# Patient Record
Sex: FEMALE | Race: BLACK OR AFRICAN AMERICAN
[De-identification: names, ages, dates, MRNs, and addresses within clinical notes are randomized per-mention and may not be internally consistent; named-entity substitution may affect disease eponyms.]

---

## 2017-12-08 ENCOUNTER — HOSPITAL ENCOUNTER (EMERGENCY)
Dept: HOSPITAL 61 - ER | Age: 27
Discharge: HOME | End: 2017-12-08
Payer: COMMERCIAL

## 2017-12-08 VITALS — HEIGHT: 66 IN | BODY MASS INDEX: 21.69 KG/M2 | WEIGHT: 135 LBS

## 2017-12-08 VITALS — SYSTOLIC BLOOD PRESSURE: 116 MMHG | DIASTOLIC BLOOD PRESSURE: 68 MMHG

## 2017-12-08 DIAGNOSIS — M32.9: ICD-10-CM

## 2017-12-08 DIAGNOSIS — L12.0: Primary | ICD-10-CM

## 2017-12-08 DIAGNOSIS — Z88.8: ICD-10-CM

## 2017-12-08 DIAGNOSIS — Z88.2: ICD-10-CM

## 2017-12-08 DIAGNOSIS — Z88.5: ICD-10-CM

## 2017-12-08 PROCEDURE — 99283 EMERGENCY DEPT VISIT LOW MDM: CPT

## 2017-12-08 NOTE — PHYS DOC
Past Medical History


Past Medical History:  Other


Additional Past Medical Histor:  LUPUS


Past Surgical History:  No Surgical History


Alcohol Use:  Rarely


Drug Use:  None





Adult General


Chief Complaint


Chief Complaint:  SKIN PROBLEM





HPI


HPI





Patient is a 27  year old female presents the ED complaining of blisters to 

body 3 days. Patient has a history of lupus. States she started to develop 

small blisters on her arms and now it's on her upper body, abdomen, back and 

neck. Describes the blisters as painful. Rates the pain as 9 out of 10. States 

she hasn't taken any new medications, new soaps, new lotions or detergents. 

States she takes medicine that she has always been taking for her lupus with no 

recent changes. Patient states she was unable to get in to see her 

rheumatologist today. Denies fever, nausea/vomiting, sick contacts with similar 

symptoms, abdominal pain, chest pain, shortness of breath, vision changes, 

dizziness or weakness.





Review of Systems


Review of Systems





Constitutional: Denies fever or chills []


Eyes: Denies change in visual acuity, redness, or eye pain []


HENT: Denies nasal congestion or sore throat []


Respiratory: Denies cough or shortness of breath []


Cardiovascular: No additional information not addressed in HPI []


GI: Denies abdominal pain, nausea, vomiting, bloody stools or diarrhea []


: Denies dysuria or hematuria []


Musculoskeletal: Denies back pain or joint pain []


Integument: Denies rash or skin lesions []


Neurologic: Denies headache, focal weakness or sensory changes []


Endocrine: Denies polyuria or polydipsia []





All other systems were reviewed and found to be within normal limits, except as 

documented in this note.





Allergies


Allergies





Allergies








Coded Allergies Type Severity Reaction Last Updated Verified


 


  hydrocodone Allergy Intermediate  12/8/17 No


 


  sulfamethoxazole Allergy Intermediate  12/8/17 No


 


  trimethoprim Allergy Intermediate  12/8/17 No











Physical Exam


Physical Exam





Constitutional: Well developed, well nourished, no acute distress, non-toxic 

appearance. []


HENT: Normocephalic, atraumatic, bilateral external ears normal, oropharynx 

moist, no oral exudates, nose normal. []


Eyes: PERRLA, EOMI, conjunctiva normal, no discharge. [] 


Cardiovascular:Heart rate regular rhythm, no murmur []


Lungs & Thorax:  Bilateral breath sounds clear to auscultation []


Skin: Warm, dry, MULTILPLE SMALL BLISTERS TO ARMS, BACK, TRUNK AND NECK 

CONSISTENT WITH BULLOUS PEMPHIGOID. [] 


Back: No tenderness, no CVA tenderness. [] 


Neurologic: Alert and oriented X 3, normal motor function, normal sensory 

function, no focal deficits noted. []


Psychologic: Affect normal, judgement normal, mood normal. []





Current Patient Data


Vital Signs





 Vital Signs








  Date Time  Temp Pulse Resp B/P (MAP) Pulse Ox O2 Delivery O2 Flow Rate FiO2


 


12/8/17 13:42 98.6 85 18  100 Room Air  





 98.6       











EKG


EKG


[]





Radiology/Procedures


Radiology/Procedures


[]





Course & Med Decision Making


Course & Med Decision Making


Pertinent Labs and Imaging studies reviewed. (See chart for details)





[]Patient tried to get in to see her rheumatologist today but was unable to. 

Discussed with patient the need to see her rheumatologist for further 

evaluation. Patient's tetanus up-to-date. Patient's pain improved in the ED. 

Provided in-depth education and symptomatic treatment measures for patient. 

Will discharge with doxycycline, prednisone and Percocet for analgesics. 

Patient states she should be able to get into see her doctor early next week. 

Discussed reasons to return to the ED. Patient understands and agrees with plan.





Dragon Disclaimer


Dragon Disclaimer


This electronic medical record was generated, in whole or in part, using a 

voice recognition dictation system.





Departure


Departure


Impression:  


 Primary Impression:  


 Bullous pemphigoid


Disposition:  01 HOME, SELF-CARE


Condition:  STABLE


Referrals:  


NON,STAFF (PCP)








LYN MONAE MD


Patient Instructions:  Bullous Pemphigoid


Scripts


Oxycodone/Apap 5-325 (PERCOCET 5-325 MG TABLET) 1 Each Tablet


1 TAB PO TID, #12 TAB


   Prov: EMILIE BOONE         12/8/17 


Prednisone (PREDNISONE) 20 Mg Tablet


2 TAB PO DAILY, #12 TAB


   Prov: EMILIE BOONE         12/8/17 


Doxycycline Hyclate (DOXYCYCLINE HYCLATE) 100 Mg Tablet.


1 TAB PO BID, #20 TAB


   Prov: EMILIE BOONE         12/8/17











EMILIE BOONE Dec 8, 2017 14:23

## 2017-12-09 ENCOUNTER — HOSPITAL ENCOUNTER (EMERGENCY)
Dept: HOSPITAL 61 - ER | Age: 27
Discharge: HOME | End: 2017-12-09
Payer: COMMERCIAL

## 2017-12-09 VITALS — BODY MASS INDEX: 21.19 KG/M2 | WEIGHT: 135 LBS | HEIGHT: 67 IN

## 2017-12-09 VITALS — DIASTOLIC BLOOD PRESSURE: 85 MMHG | SYSTOLIC BLOOD PRESSURE: 149 MMHG

## 2017-12-09 DIAGNOSIS — Z88.5: ICD-10-CM

## 2017-12-09 DIAGNOSIS — Z76.5: ICD-10-CM

## 2017-12-09 DIAGNOSIS — Z88.2: ICD-10-CM

## 2017-12-09 DIAGNOSIS — L12.0: Primary | ICD-10-CM

## 2017-12-09 DIAGNOSIS — M32.9: ICD-10-CM

## 2017-12-09 DIAGNOSIS — Z88.1: ICD-10-CM

## 2017-12-09 DIAGNOSIS — F11.10: ICD-10-CM

## 2017-12-09 PROCEDURE — 99282 EMERGENCY DEPT VISIT SF MDM: CPT

## 2017-12-09 NOTE — PHYS DOC
Past Medical History


Past Medical History:  Other


Additional Past Medical Histor:  LUPUS


Past Surgical History:  No Surgical History


Alcohol Use:  Rarely


Drug Use:  None





Adult General


Chief Complaint


Chief Complaint:  PAIN CONTROL





HPI


HPI





Patient is a 27  year old female who presents complaining her prescription for 

oxycodone was cancelled by Dr. Park yesterday and she would like another 

prescription. She was seen yesterday in the ED for bullous pemphigoid and was 

discharged with doxycycline, prednisone and oxycodone. She states she went to 

the pharmacy and they counseled her oxycodone prescription. She has hx of Lupus.





Review of Systems


Review of Systems





Constitutional: Denies fever or chills []


Eyes: Denies change in visual acuity, redness, or eye pain []


HENT: Denies nasal congestion or sore throat []


Respiratory: Denies cough or shortness of breath []


Cardiovascular: No additional information not addressed in HPI []


GI: Denies abdominal pain, nausea, vomiting, bloody stools or diarrhea []


: Denies dysuria or hematuria []


Musculoskeletal: Denies back pain or joint pain []


Integument: rash


Neurologic: Denies headache, focal weakness or sensory changes []








All other systems were reviewed and found to be within normal limits, except as 

documented in this note.





Current Medications


Current Medications





Current Medications








 Medications


  (Trade)  Dose


 Ordered  Sig/Tomas  Start Time


 Stop Time Status Last Admin


Dose Admin


 


 Acetaminophen


  (Tylenol)  500 mg  1X  ONCE  17 18:30


 17 18:35 DC 17 18:31


500 MG











Allergies


Allergies





Allergies








Coded Allergies Type Severity Reaction Last Updated Verified


 


  hydrocodone Allergy Intermediate  17 No


 


  sulfamethoxazole Allergy Intermediate  17 No


 


  trimethoprim Allergy Intermediate  17 No











Physical Exam


Physical Exam





Constitutional: Well developed, well nourished, no acute distress, non-toxic 

appearance. []


HENT: Normocephalic, atraumatic, bilateral external ears normal, oropharynx 

moist, no oral exudates, nose normal. []


Eyes: PERRLA, EOMI, conjunctiva normal, no discharge. [] 


Neck: Normal range of motion, no tenderness, supple, no stridor. [] 


Cardiovascular:Heart rate regular rhythm, no murmur []


Lungs & Thorax:  Bilateral breath sounds clear to auscultation []


Abdomen: Bowel sounds normal, soft, no tenderness, no masses, no pulsatile 

masses. [] 


Skin: Warm, dry, blisters noted to bilateral upper extermities consistent with 

bullous pemphigoid. Patient not willing to be examined further she states she 

wants pain medicines.


Back: No tenderness, no CVA tenderness. [] 


Extremities: No tenderness, no cyanosis, no clubbing, ROM intact, no edema. [] 


Neurologic: Alert and oriented X 3, normal motor function, normal sensory 

function, no focal deficits noted. []


Psychologic: Affect normal, judgement normal, mood normal. []





Current Patient Data


Vital Signs





 Vital Signs








  Date Time  Temp Pulse Resp B/P (MAP) Pulse Ox O2 Delivery O2 Flow Rate FiO2


 


17 18:15 99.2 106 16  97 Room Air  





 99.2       











EKG


EKG


[]





Radiology/Procedures


Radiology/Procedures


[]





Course & Med Decision Making


Course & Med Decision Making


Pertinent Labs and Imaging studies reviewed. (See chart for details)





This is a 27 year old female patient presenting to the ED complaining that her 

prescription for oxycodone she got yesterday for bullous pemphigoid lesions was 

cancelled. Informed patient I will not refill this prescription. I will give 

her Tylenol in the Ed and she should fill rx for doxycycline and prednisone but 

she got yesterday. Patient with very upset. She states that she is going in the 

car and never returned.








INFORMED PATIENT I JUST SAW HER IN THE ED ON 2017 (UNDER THE NAME  BAKARI MARTINI  12/3/1989). SHE STATES THAT IS HER TWIN SISTER. I ASKED HER, HER TWIN 

SISTER HAS THE SAME EXACT TWO GIRLS SHE HAD WHEN I SAW HER 2017. SHE 

STATES SHE HAS two BOYS, SHE IS BABY SITING HER SISTERS CHILDREN. I ASKED HER 

WHERE HER CHILDREN WERE. SHE WILL NOT GIVE ME ANSWERS. PLEASE TAKE NOTE HER  

IS DIFFERENT FROM HER SO CALLED TWIN SISTER'S DATE OF BIRTH. SHE ELOPED AS SOON 

AS SHE REALIZED WE HAD FIGURED HER ALIAS NAMES.





Dragon Disclaimer


Dragon Disclaimer


This electronic medical record was generated, in whole or in part, using a 

voice recognition dictation system.





Departure


Departure


Impression:  


 Primary Impression:  


 Bullous pemphigoid


 Additional Impressions:  


 Narcotic abuse


 Drug-seeking behavior


Disposition:   HOME, SELF-CARE


Condition:  STABLE


Referrals:  


NO PCP (PCP)


follow up with your doctor next week


Patient Instructions:  Bullous Pemphigoid





Additional Instructions:  


Our ER will NOT give you another narcotics prescription.





You need to follow up with your own doctor as soon as you can.





Please fill prescriptions for prednisone and doxycycline that you already have.





Take over the counter pain medicines as needed for pain.





Problem Qualifiers











RUTHY ROSE Dec 9, 2017 18:30

## 2019-04-19 ENCOUNTER — HOSPITAL ENCOUNTER (EMERGENCY)
Dept: HOSPITAL 61 - ER | Age: 29
Discharge: HOME | End: 2019-04-19
Payer: COMMERCIAL

## 2019-04-19 VITALS — WEIGHT: 170 LBS | HEIGHT: 69 IN | BODY MASS INDEX: 25.18 KG/M2

## 2019-04-19 VITALS — SYSTOLIC BLOOD PRESSURE: 103 MMHG | DIASTOLIC BLOOD PRESSURE: 53 MMHG

## 2019-04-19 DIAGNOSIS — R42: ICD-10-CM

## 2019-04-19 DIAGNOSIS — R53.83: ICD-10-CM

## 2019-04-19 DIAGNOSIS — Z88.1: ICD-10-CM

## 2019-04-19 DIAGNOSIS — Z88.5: ICD-10-CM

## 2019-04-19 DIAGNOSIS — R11.0: ICD-10-CM

## 2019-04-19 DIAGNOSIS — N92.1: Primary | ICD-10-CM

## 2019-04-19 DIAGNOSIS — R53.1: ICD-10-CM

## 2019-04-19 DIAGNOSIS — Z88.2: ICD-10-CM

## 2019-04-19 LAB
ANION GAP SERPL CALC-SCNC: 13 MMOL/L (ref 6–14)
ANISOCYTOSIS BLD QL SMEAR: (no result)
BASO STIPL BLD QL SMEAR: PRESENT
BASOPHILS # BLD AUTO: 0.1 X10^3/UL (ref 0–0.2)
BASOPHILS NFR BLD: 1 % (ref 0–3)
BUN SERPL-MCNC: 15 MG/DL (ref 7–20)
CALCIUM SERPL-MCNC: 9 MG/DL (ref 8.5–10.1)
CHLORIDE SERPL-SCNC: 103 MMOL/L (ref 98–107)
CO2 SERPL-SCNC: 23 MMOL/L (ref 21–32)
CREAT SERPL-MCNC: 0.8 MG/DL (ref 0.6–1)
EOSINOPHIL NFR BLD: 0.2 X10^3/UL (ref 0–0.7)
EOSINOPHIL NFR BLD: 2 % (ref 0–3)
ERYTHROCYTE [DISTWIDTH] IN BLOOD BY AUTOMATED COUNT: 20.2 % (ref 11.5–14.5)
GFR SERPLBLD BASED ON 1.73 SQ M-ARVRAT: 104.9 ML/MIN
GLUCOSE SERPL-MCNC: 108 MG/DL (ref 70–99)
HCT VFR BLD CALC: 28.7 % (ref 36–47)
HGB BLD-MCNC: 8.7 G/DL (ref 12–15.5)
HYPOCHROMIA BLD QL SMEAR: (no result)
LYMPHOCYTES # BLD: 2 X10^3/UL (ref 1–4.8)
LYMPHOCYTES NFR BLD AUTO: 20 % (ref 24–48)
MAGNESIUM SERPL-MCNC: 2 MG/DL (ref 1.8–2.4)
MCH RBC QN AUTO: 20 PG (ref 25–35)
MCHC RBC AUTO-ENTMCNC: 31 G/DL (ref 31–37)
MCV RBC AUTO: 66 FL (ref 79–100)
MICROCYTES BLD QL SMEAR: (no result)
MONO #: 0.5 X10^3/UL (ref 0–1.1)
MONOCYTES NFR BLD: 5 % (ref 0–9)
NEUT #: 7.2 X10^3UL (ref 1.8–7.7)
NEUTROPHILS NFR BLD AUTO: 72 % (ref 31–73)
OVALOCYTES BLD QL SMEAR: (no result)
PLATELET # BLD AUTO: 423 X10^3/UL (ref 140–400)
PLATELET # BLD EST: ADEQUATE 10*3/UL
POIKILOCYTOSIS BLD QL SMEAR: (no result)
POLYCHROMASIA BLD QL SMEAR: SLIGHT
POTASSIUM SERPL-SCNC: 3.7 MMOL/L (ref 3.5–5.1)
RBC # BLD AUTO: 4.35 X10^6/UL (ref 3.5–5.4)
SCHISTOCYTES BLD QL SMEAR: (no result)
SODIUM SERPL-SCNC: 139 MMOL/L (ref 136–145)
WBC # BLD AUTO: 10 X10^3/UL (ref 4–11)

## 2019-04-19 PROCEDURE — 85025 COMPLETE CBC W/AUTO DIFF WBC: CPT

## 2019-04-19 PROCEDURE — 81025 URINE PREGNANCY TEST: CPT

## 2019-04-19 PROCEDURE — 76856 US EXAM PELVIC COMPLETE: CPT

## 2019-04-19 PROCEDURE — 87591 N.GONORRHOEAE DNA AMP PROB: CPT

## 2019-04-19 PROCEDURE — 80048 BASIC METABOLIC PNL TOTAL CA: CPT

## 2019-04-19 PROCEDURE — 76830 TRANSVAGINAL US NON-OB: CPT

## 2019-04-19 PROCEDURE — 83735 ASSAY OF MAGNESIUM: CPT

## 2019-04-19 PROCEDURE — 99285 EMERGENCY DEPT VISIT HI MDM: CPT

## 2019-04-19 PROCEDURE — 87491 CHLMYD TRACH DNA AMP PROBE: CPT

## 2019-04-19 PROCEDURE — 96360 HYDRATION IV INFUSION INIT: CPT

## 2019-04-19 PROCEDURE — 36415 COLL VENOUS BLD VENIPUNCTURE: CPT

## 2019-04-19 NOTE — PHYS DOC
Past Medical History


Past Medical History:  Other


Additional Past Medical Histor:  LUPUS


Past Surgical History:  No Surgical History


Alcohol Use:  Rarely


Drug Use:  None





Adult General


Chief Complaint


Chief Complaint:  VAGINAL BLEEDING





HPI


HPI





Patient is a  28 year old female who presents with vaginal bleeding. 

Patient states that she has been continuously bleeding for the past three 

months. Patient states that she has been going through about fifteen pads a 

day. Patient states that the blood is bright red. Patient states that the 

bleeding became more significant and associated with large clots over the past 

few days. Patient reports feeling nauseous, weak, fatigued, and lightheaded 

over the past three months. Patient reports having a decreased appetite and 

losing fifty pounds unintentionally in the past three months. Patient states 

that she last gave birth on 20 and had a tubal ligation afterwards. 

Patient denies having any abdominal pain. Patient states that she has noticed a 

vaginal odor, however, she denies any abnormal discharge.





Review of Systems


Review of Systems





Constitutional: Denies fever or chills 


Eyes: Denies change in visual acuity, or eye pain 


HENT: Denies nasal congestion or sore throat 


Respiratory: Denies cough or shortness of breath 


Cardiovascular: Denies chest pain or palpitations


GI: Denies abdominal pain, vomiting, or diarrhea


: Denies dysuria or hematuria 


Musculoskeletal: Denies back pain or joint pain 


Integument: Denies rash or skin lesions 


Neurologic: Denies headache, focal weakness or sensory changes 








Complete systems were reviewed and found to be within normal limits, except as 

documented in this note.





Current Medications


Current Medications





Current Medications








 Medications


  (Trade)  Dose


 Ordered  Sig/Tomas  Start Time


 Stop Time Status Last Admin


Dose Admin


 


 Sodium Chloride  1,000 ml @ 


 1,000 mls/hr  1X  ONCE  19 03:45


 19 04:44  19 04:19


1,000 MLS/HR











Allergies


Allergies





Allergies








Coded Allergies Type Severity Reaction Last Updated Verified


 


  hydrocodone Allergy Intermediate  17 No


 


  sulfamethoxazole Allergy Intermediate  17 No


 


  trimethoprim Allergy Intermediate  17 No











Physical Exam


Physical Exam





Constitutional: Well developed, well nourished, no acute distress, non-toxic 

appearance. 


HENT: Normocephalic, atraumatic, oropharynx moist, no oral exudates, nose 

normal. 


Eyes: PERRL, conjunctiva normal, no discharge.  


Neck: Normal range of motion, supple, no stridor. 


Cardiovascular:Heart rate regular rhythm, no murmur 


Lungs & Thorax:  Bilateral breath sounds clear to auscultation 


Abdomen: Bowel sounds normal, soft, no tenderness on palpation.


Skin: Warm, dry, no rash. 


Back: No tenderness, no CVA tenderness. 


Extremities: No tenderness, ROM intact, no edema. 


Pelvic: Scant blood noted in the vaginal vault. Female chaperone, Elvia MS3,  

present during the entire examination.  


Neurologic: Alert and oriented X3, normal motor function, normal sensory 

function, no focal deficits noted.


Psychologic: Affect normal. Normal speech.





Current Patient Data


Vital Signs





 Vital Signs








  Date Time  Temp Pulse Resp B/P (MAP) Pulse Ox O2 Delivery O2 Flow Rate FiO2


 


19 03:08 98.9  16 126/70 (88) 100 Room Air  





 98.9       








Lab Values





 Laboratory Tests








Test


 19


03:18 19


03:30


 


POC Urine HCG, Qualitative


 Hcg negative


(Negative) 





 


White Blood Count


 


 10.0 x10^3/uL


(4.0-11.0)


 


Red Blood Count


 


 4.35 x10^6/uL


(3.50-5.40)


 


Hemoglobin


 


 8.7 g/dL


(12.0-15.5)  L


 


Hematocrit


 


 28.7 %


(36.0-47.0)  L


 


Mean Corpuscular Volume


 


 66 fL ()


L


 


Mean Corpuscular Hemoglobin


 


 20 pg (25-35)


L


 


Mean Corpuscular Hemoglobin


Concent 


 31 g/dL


(31-37)


 


Red Cell Distribution Width


 


 20.2 %


(11.5-14.5)  H


 


Platelet Count


 


 423 x10^3/uL


(140-400)  H


 


Neutrophils (%) (Auto)  72 % (31-73)  


 


Lymphocytes (%) (Auto)  20 % (24-48)  L


 


Monocytes (%) (Auto)  5 % (0-9)  


 


Eosinophils (%) (Auto)  2 % (0-3)  


 


Basophils (%) (Auto)  1 % (0-3)  


 


Neutrophils # (Auto)


 


 7.2 x10^3uL


(1.8-7.7)


 


Lymphocytes # (Auto)


 


 2.0 x10^3/uL


(1.0-4.8)


 


Monocytes # (Auto)


 


 0.5 x10^3/uL


(0.0-1.1)


 


Eosinophils # (Auto)


 


 0.2 x10^3/uL


(0.0-0.7)


 


Basophils # (Auto)


 


 0.1 x10^3/uL


(0.0-0.2)


 


Platelet Estimate


 


 Adequate


(ADEQUATE)


 


Polychromasia  Slight  


 


Hypochromasia  Marked  


 


Poikilocytosis  Mod  


 


Basophilic Stippling  Present  


 


Anisocytosis  Mod  


 


Microcytosis  Marked  


 


Ovalocytes  Few  


 


Schistocytes  Occ  


 


Sodium Level


 


 139 mmol/L


(136-145)


 


Potassium Level


 


 3.7 mmol/L


(3.5-5.1)


 


Chloride Level


 


 103 mmol/L


()


 


Carbon Dioxide Level


 


 23 mmol/L


(21-32)


 


Anion Gap  13 (6-14)  


 


Blood Urea Nitrogen


 


 15 mg/dL


(7-20)


 


Creatinine


 


 0.8 mg/dL


(0.6-1.0)


 


Estimated GFR


(Cockcroft-Gault) 


 104.9  





 


Glucose Level


 


 108 mg/dL


(70-99)  H


 


Calcium Level


 


 9.0 mg/dL


(8.5-10.1)


 


Magnesium Level


 


 2.0 mg/dL


(1.8-2.4)





 Laboratory Tests


19 03:30








 Laboratory Tests


19 03:30











Microbiology


19 Wet Prep - Final, Complete


          





Microbiology


19 Wet Prep - Final, Complete





EKG


EKG


[]





Radiology/Procedures


Radiology/Procedures


PROCEDURE: PELVIS W/TV





Complete pelvic ultrasound


 


HISTORY: Menorrhagia, 10 months postpartum


 


TECHNIQUE: Transabdominal transvaginal transducers with grayscale and 


duplex Doppler sonography.


 


FINDINGS: Transabdominal sonography demonstrates anteverted uterus. Right 


ovary measures 4.0 x 1.7 x 2.3 cm with a 1.4 cm dominant follicle. Left 


ovary not visualized.


 


Transvaginal sonography demonstrates anteverted uterus measuring 8.9 x 6.7


x 4.3 cm. Fundal endometrium thickness 0.5 cm. Mild hypoechoic fluid at 


the lower uterine endometrial canal. No mass or hypervascularity of the 


endometrium to localize retained product of conception. No uterine mass 


documented. Right ovary measures 1.6 x 4.3 x 1.8 cm, left ovary measures 


2.5 x 1.4 x 1.3 cm. Intact bilateral ovarian blood flow. Right ovarian 1.8


cm dominant follicle. Subcentimeter left ovarian follicles. No pelvic 


fluid.


 


IMPRESSION: Thin slip of lower uterine endometrial fluid. No uterine or 


endometrial mass or endometrial thickening evident. No sonographic 


findings of retained products of conception. Otherwise negative exam.


 


Electronically signed by: Reggie Dominguez MD (2019 4:09 AM) 


[facility]











Course & Med Decision Making


Course & Med Decision Making


Patient is a 28 year old female who presents to the ED for vaginal bleeding. 

Patient given one liter of fluids in the ED. 





Pertinent Labs and Imaging studies reviewed. (See chart for details). Pelvic 

ultrasound did not reveal any acute abnormalities. Hemoglobin noted to be 8.7. 

Patient instructed to follow up with her GYN physician. 





Patient stable for discharge with outpatient follow-up with PCP. Discussed 

findings and plan with patient and family, who acknowledge understanding and 

agreement.





Dragon Disclaimer


Dragon Disclaimer


This electronic medical record was generated, in whole or in part, using a 

voice recognition dictation system.





Departure


Departure


Impression:  


 Primary Impression:  


 Menometrorrhagia


Disposition:  01 HOME, SELF-CARE


Condition:  STABLE


Referrals:  


NO PCP (PCP)








TAYLOR VALENTINE Jr, MD


Patient Instructions:  Anemia, Nonspecific-Brief, Menorrhagia, Easy-to-Read











LATISHA LALA DO 2019 03:34

## 2019-04-19 NOTE — RAD
Complete pelvic ultrasound

 

HISTORY: Menorrhagia, 10 months postpartum

 

TECHNIQUE: Transabdominal transvaginal transducers with grayscale and 

duplex Doppler sonography.

 

FINDINGS: Transabdominal sonography demonstrates anteverted uterus. Right 

ovary measures 4.0 x 1.7 x 2.3 cm with a 1.4 cm dominant follicle. Left 

ovary not visualized.

 

Transvaginal sonography demonstrates anteverted uterus measuring 8.9 x 6.7

x 4.3 cm. Fundal endometrium thickness 0.5 cm. Mild hypoechoic fluid at 

the lower uterine endometrial canal. No mass or hypervascularity of the 

endometrium to localize retained product of conception. No uterine mass 

documented. Right ovary measures 1.6 x 4.3 x 1.8 cm, left ovary measures 

2.5 x 1.4 x 1.3 cm. Intact bilateral ovarian blood flow. Right ovarian 1.8

cm dominant follicle. Subcentimeter left ovarian follicles. No pelvic 

fluid.

 

IMPRESSION: Thin slip of lower uterine endometrial fluid. No uterine or 

endometrial mass or endometrial thickening evident. No sonographic 

findings of retained products of conception. Otherwise negative exam.

 

Electronically signed by: Reggie Dominguez MD (4/19/2019 4:09 AM) 

[facility]

## 2019-09-08 ENCOUNTER — HOSPITAL ENCOUNTER (EMERGENCY)
Dept: HOSPITAL 61 - ER | Age: 29
Discharge: HOME | End: 2019-09-08
Payer: MEDICAID

## 2019-09-08 VITALS
HEIGHT: 69 IN | BODY MASS INDEX: 25.18 KG/M2 | DIASTOLIC BLOOD PRESSURE: 53 MMHG | SYSTOLIC BLOOD PRESSURE: 103 MMHG | WEIGHT: 170 LBS

## 2019-09-08 DIAGNOSIS — Z88.2: ICD-10-CM

## 2019-09-08 DIAGNOSIS — M54.5: Primary | ICD-10-CM

## 2019-09-08 DIAGNOSIS — Z88.5: ICD-10-CM

## 2019-09-08 DIAGNOSIS — Z88.1: ICD-10-CM

## 2019-09-08 PROCEDURE — 99283 EMERGENCY DEPT VISIT LOW MDM: CPT

## 2019-09-08 PROCEDURE — 96372 THER/PROPH/DIAG INJ SC/IM: CPT

## 2019-09-08 NOTE — PHYS DOC
Past Medical History


Past Medical History:  Other


Additional Past Medical Histor:  LUPUS


Past Surgical History:  No Surgical History


Alcohol Use:  Rarely


Drug Use:  None





Adult General


Chief Complaint


Chief Complaint:  BACK PAIN OR INJURY





HPI


HPI





Patient is a 28  year old female that presents with back pain has been ongoing 

for several days. The patient states she works at  and she bent 

over to pick something up and started having back pain on the left lower side. 

She rates pain as 7 out of 10 in severity and sharp. Has not taken any medicine 

prior to arrival.





Review of Systems


Review of Systems





Constitutional: Denies fever or chills []


Eyes: Denies change in visual acuity, redness, or eye pain []


HENT: Denies nasal congestion or sore throat []


Respiratory: Denies cough or shortness of breath []


Cardiovascular: No additional information not addressed in HPI []


GI: Denies abdominal pain, nausea, vomiting, bloody stools or diarrhea []


: Denies dysuria or hematuria []


Musculoskeletal: Reports back pain.


Integument: Denies rash or skin lesions []


Neurologic: Denies headache, focal weakness or sensory changes []


Endocrine: Denies polyuria or polydipsia []





Complete systems were reviewed and found to be within normal limits, except as 

documented in this note.





Allergies


Allergies





Allergies








Coded Allergies Type Severity Reaction Last Updated Verified


 


  hydrocodone Allergy Intermediate  12/8/17 No


 


  sulfamethoxazole Allergy Intermediate  12/8/17 No


 


  trimethoprim Allergy Intermediate  12/8/17 No











Physical Exam


Physical Exam





Constitutional: Well developed, well nourished, no acute distress, non-toxic 

appearance. []


HENT: Normocephalic, atraumatic, bilateral external ears normal, oropharynx 

moist, no oral exudates, nose normal. []


Eyes: PERRLA, EOMI, conjunctiva normal, no discharge. [] 


Neck: Normal range of motion, no tenderness, supple, no stridor. [] 


Cardiovascular:Heart rate regular rhythm, no murmur []


Lungs & Thorax:  Bilateral breath sounds clear to auscultation []


Abdomen: Bowel sounds normal, soft, no tenderness, no masses, no pulsatile 

masses. [] 


Skin: Warm, dry, no erythema, no rash. [] 


Back: Tenderness left lower back on palpation.


Extremities: No tenderness, no cyanosis, no clubbing, ROM intact, no edema. [] 


Neurologic: Alert and oriented X 3, normal motor function, normal sensory 

function, no focal deficits noted. []


Psychologic: Affect normal, judgement normal, mood normal. []





EKG


EKG


[]





Radiology/Procedures


Radiology/Procedures


[]





Course & Med Decision Making


Course & Med Decision Making


Pertinent Labs and Imaging studies reviewed. (See chart for details)





Appears from assessment to have a musculoskeletal strain in her lower back. Will

 give Norflex in ER and then will write script for Ibuprofen and Norflex. 

Discussed with patient that Norflex can make you sleepy and it is best taken 

before bedtime.





Dragon Disclaimer


Dragon Disclaimer


This electronic medical record was generated, in whole or in part, using a voice

 recognition dictation system.





Departure


Departure


Impression:  


   Primary Impression:  


   Musculoskeletal back pain


Disposition:  01 HOME, SELF-CARE


Condition:  STABLE


Referrals:  


NO PCP (PCP)


Patient Instructions:  Musculoskeletal Pain





Additional Instructions:  


Thank you for visiting [facility]. We appreciate you trusting us 

with your care. If any additional problems come up don't hesitate to return to 

visit us. Please follow up with your primary care provider so they can plan 

additional care if needed and know about the problem that you had. If symptoms 

worsen come back to the Emergency Department. Any concerning symptoms that start

 such as chest pain, shortness of air, weakness or numbness on one side of the 

body, running high fevers or any other concerning symptoms return to the ER.





Please fill your medications at any pharmacy and follow the prescription 

instructions.








The Norflex is best use before bedtime as it makes her sleepy. He can also use 

nonpharmacological interventions such as foam roller, using a tennis ball rule 

out the muscle in your back. You can also try heat patches over-the-counter. Be 

careful not to keep them on longer than label instructions indicate.


Scripts


Orphenadrine Citrate (ORPHENADRINE CITRATE) 100 Mg Tablet.er


1 TAB PO BID PRN for MUSCLE PAIN, #20 TAB


   Prov: LATISHA GUTIERREZ         9/8/19 


Ibuprofen (IBUPROFEN) 400 Mg Tablet


400 MG PO PRN Q6HRS PRN for INFLAMMATION for 5 Days, #20 TAB


   Prov: LATISHA GUTIERREZ         9/8/19











LATISHA GUTIERREZ           Sep 8, 2019 19:27

## 2020-01-26 ENCOUNTER — HOSPITAL ENCOUNTER (EMERGENCY)
Dept: HOSPITAL 61 - ER | Age: 30
Discharge: HOME | End: 2020-01-26
Payer: MEDICAID

## 2020-01-26 VITALS — BODY MASS INDEX: 25.21 KG/M2 | WEIGHT: 170.2 LBS | HEIGHT: 69 IN

## 2020-01-26 VITALS — DIASTOLIC BLOOD PRESSURE: 53 MMHG | SYSTOLIC BLOOD PRESSURE: 103 MMHG

## 2020-01-26 DIAGNOSIS — S93.402A: Primary | ICD-10-CM
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
## 2020-01-26 DIAGNOSIS — Z88.2: ICD-10-CM

## 2020-01-26 DIAGNOSIS — M32.9: ICD-10-CM

## 2020-01-26 DIAGNOSIS — Z88.5: ICD-10-CM
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
## 2020-01-26 DIAGNOSIS — Z88.1: ICD-10-CM

## 2020-01-26 PROCEDURE — 99284 EMERGENCY DEPT VISIT MOD MDM: CPT

## 2020-01-26 PROCEDURE — 73610 X-RAY EXAM OF ANKLE: CPT

## 2020-01-26 PROCEDURE — L4350 ANKLE CONTROL ORTHO PRE OTS: HCPCS

## 2020-01-26 NOTE — RAD
ANKLE LEFT 3V

 

History: Injury. Pain.

 

Technique: 3 views left ankle.

 

Comparison: None.

 

Findings:

Normal alignment. No fracture. Symmetric ankle mortise.

 

Impression: 

1.  No acute osseous abnormality.

 

Electronically signed by: Jerome Brian DO (1/26/2020 3:44 PM) [facility]

## 2020-01-26 NOTE — PHYS DOC
Past Medical History


Past Medical History:  No Pertinent History, Other


Additional Past Medical Histor:  LUPUS


Past Surgical History:  No Surgical History


Alcohol Use:  Rarely


Drug Use:  None





Adult General


Chief Complaint


Chief Complaint:  ANKLE PROBLEM





HPI


HPI





Patient is a 29  year old female who presents with [pain to her left ankle. 

States she had stepped in a hole in the yard last night, has been having pain in

 her left ankle since that time. States she has not been able to walk on it due 

to discomfort. States she did take some ibuprofen this morning, noted didn't 

seem to help very much. Denies pain to knee, back, or elsewhere]





Review of Systems


Review of Systems





Constitutional: Denies fever or chills []





Respiratory: Denies cough or shortness of breath []


Cardiovascular: No additional information not addressed in HPI []





Musculoskeletal: Denies back pain or joint pain, complains of pain to left 

ankle. []


Integument: Denies rash or skin lesions []


Neurologic: Denies headache, focal weakness or sensory changes []








All other systems were reviewed and found to be within normal limits, except as 

documented in this note.





Current Medications


Current Medications





Current Medications








 Medications


  (Trade)  Dose


 Ordered  Sig/Tomas  Start Time


 Stop Time Status Last Admin


Dose Admin


 


 Tramadol HCl


  (Ultram)  50 mg  1X  ONCE  1/26/20 15:15


 1/26/20 15:17 DC 1/26/20 15:36


50 MG











Allergies


Allergies





Allergies








Coded Allergies Type Severity Reaction Last Updated Verified


 


  hydrocodone Allergy Intermediate  12/8/17 No


 


  sulfamethoxazole Allergy Intermediate  12/8/17 No


 


  trimethoprim Allergy Intermediate  12/8/17 No











Physical Exam


Physical Exam





Constitutional: Well developed, well nourished, no acute distress, non-toxic 

appearance. []


[] 


Cardiovascular:Heart rate regular rhythm, no murmur []


Lungs & Thorax:  Bilateral breath sounds clear to auscultation []





Skin: Warm, dry, no erythema, no rash. [] 





Extremities: No tenderness, no cyanosis, no clubbing, ROM intact, no edema. 

Discomfort on palpation to foot, decreased ability to flex foot due to 

discomfort. No gross deformity appreciated[] 


Neurologic: Alert and oriented X 3, normal motor function, normal sensory 

function, no focal deficits noted. []


Psychologic: Affect normal, judgement normal, mood normal. []





Current Patient Data


Vital Signs





                                   Vital Signs








  Date Time  Temp Pulse Resp B/P (MAP) Pulse Ox O2 Delivery O2 Flow Rate FiO2


 


1/26/20 15:36   18  98 Room Air  


 


1/26/20 14:33 98.3 99  103/53 (70)    





 98.3       











EKG


EKG


[]





Radiology/Procedures


Radiology/Procedures


[]NKLE LEFT 3V


 


History: Injury. Pain.


 


Technique: 3 views left ankle.


 


Comparison: None.


 


Findings:


Normal alignment. No fracture. Symmetric ankle mortise.


 


Impression: 


1.  No acute osseous abnormality.


 


Electronically signed by: Jerome Brian DO (1/26/2020 3:44 PM) [facility]





Course & Med Decision Making


Course & Med Decision Making


Pertinent Labs and Imaging studies reviewed. (See chart for details)





[Reviewed imaging results with patient, with no noted fracture.  Will provide 

ankle stirrup splint and crutches if requested.


Reviewed [facility], with patient having 5 controlled substance prescriptions over 

the past 2 months, with gabapentin, Tylenol III, and prior Oxycodone Rx. 


Will recommend patient continue NSAIDs and follow up with PCP.]





Dragon Disclaimer


Dragon Disclaimer


This electronic medical record was generated, in whole or in part, using a voice

recognition dictation system.





Departure


Departure


Impression:  


   Primary Impression:  


   Left ankle sprain


Disposition:  01 HOME, SELF-CARE


Condition:  GOOD


Referrals:  


NO PCP (PCP)


Patient Instructions:  Ankle Sprain





Additional Instructions:  


As we discussed, he should continue to put ice on her ankle. He should continue 

to take Tylenol and ibuprofen for the discomfort. We are providing an ankle 

brace to help stabilize your ankle and keep it from bending and further 

straining the ligaments in your ankle, giving it a chance to heal.  Follow up 

with your primary care provider in the next 5-7 days if you continue to have 

discomfort.  Use the crutches as needed to stay off your foot as much as 

possible.





Problem Qualifiers








   Primary Impression:  


   Left ankle sprain


   Encounter type:  initial encounter  Involved ligament of ankle:  unspecified 

   ligament  Qualified Codes:  S93.402A - Sprain of unspecified ligament of left

   ankle, initial encounter








EMILIE MILAN              Jan 26, 2020 15:11